# Patient Record
Sex: MALE | Race: OTHER | Employment: OTHER | URBAN - METROPOLITAN AREA
[De-identification: names, ages, dates, MRNs, and addresses within clinical notes are randomized per-mention and may not be internally consistent; named-entity substitution may affect disease eponyms.]

---

## 2022-04-06 ENCOUNTER — PREPPED CHART (OUTPATIENT)
Dept: URBAN - METROPOLITAN AREA CLINIC 52 | Facility: CLINIC | Age: 77
End: 2022-04-06

## 2022-04-07 ENCOUNTER — NEW PATIENT (OUTPATIENT)
Dept: URBAN - METROPOLITAN AREA CLINIC 52 | Facility: CLINIC | Age: 77
End: 2022-04-07

## 2022-04-07 DIAGNOSIS — H25.13: ICD-10-CM

## 2022-04-07 DIAGNOSIS — H52.13: ICD-10-CM

## 2022-04-07 PROCEDURE — 92015 DETERMINE REFRACTIVE STATE: CPT

## 2022-04-07 PROCEDURE — 92004 COMPRE OPH EXAM NEW PT 1/>: CPT

## 2022-04-07 ASSESSMENT — KERATOMETRY
OS_K1POWER_DIOPTERS: 41.00
OD_AXISANGLE_DEGREES: 168
OS_AXISANGLE2_DEGREES: 109
OD_AXISANGLE2_DEGREES: 78
OS_K2POWER_DIOPTERS: 42.50
OS_AXISANGLE_DEGREES: 019
OD_K2POWER_DIOPTERS: 42.50
OD_K1POWER_DIOPTERS: 41.25

## 2022-04-07 ASSESSMENT — VISUAL ACUITY
OU_SC: J16
OS_PH: 20/40
OS_CC: J10-1
OD_CC: J10
OS_CC: 20/200
OD_CC: 20/400
OU_CC: J10
OU_CC: 20/400
OD_GLARE: 20/200
OS_GLARE: 20/200
OD_PH: 20/50+2

## 2022-04-07 ASSESSMENT — TONOMETRY
OS_IOP_MMHG: 15
OD_IOP_MMHG: 13

## 2022-04-07 NOTE — PATIENT DISCUSSION
Explained to patient that if he chooses not to get cataract surgery, he will NEED to change his glasses if he wants to continue to drive. spouse

## 2022-11-10 ENCOUNTER — PRE-OP/H&P (OUTPATIENT)
Dept: URBAN - METROPOLITAN AREA CLINIC 52 | Facility: CLINIC | Age: 77
End: 2022-11-10

## 2022-11-10 DIAGNOSIS — H25.12: ICD-10-CM

## 2022-11-10 DIAGNOSIS — H52.203: ICD-10-CM

## 2022-11-10 PROCEDURE — 92136 OPHTHALMIC BIOMETRY: CPT

## 2022-11-10 PROCEDURE — 92025IOL CORNEAL TOPOGRAPHY PREMIUM IOL

## 2022-11-10 PROCEDURE — PREOP PRE OP VISIT

## 2022-11-10 ASSESSMENT — VISUAL ACUITY
OS_PH: 20/50-2
OS_CC: 20/100-1
OD_CC: 20/60
OD_PH: 20/30-1

## 2022-11-10 ASSESSMENT — KERATOMETRY
OS_K2POWER_DIOPTERS: 41.50
OD_AXISANGLE2_DEGREES: 159
OS_AXISANGLE_DEGREES: 110
OD_K2POWER_DIOPTERS: 41.62
OD_K1POWER_DIOPTERS: 42.62
OS_K1POWER_DIOPTERS: 42.75
OS_AXISANGLE2_DEGREES: 20
OD_AXISANGLE_DEGREES: 069

## 2022-11-10 ASSESSMENT — TONOMETRY
OS_IOP_MMHG: 12
OD_IOP_MMHG: 12

## 2022-11-10 NOTE — PATIENT DISCUSSION
Explained to patient that if he chooses not to get cataract surgery, he will NEED to change his glasses if he wants to continue to drive.

## 2022-11-16 ENCOUNTER — POST-OP (OUTPATIENT)
Dept: URBAN - METROPOLITAN AREA CLINIC 52 | Facility: CLINIC | Age: 77
End: 2022-11-16

## 2022-11-16 ENCOUNTER — SURGERY/PROCEDURE (OUTPATIENT)
Dept: URBAN - METROPOLITAN AREA SURGERY 16 | Facility: SURGERY | Age: 77
End: 2022-11-16

## 2022-11-16 DIAGNOSIS — Z98.42: ICD-10-CM

## 2022-11-16 DIAGNOSIS — H25.12: ICD-10-CM

## 2022-11-16 PROBLEM — Z96.1: Noted: 2022-11-16

## 2022-11-16 PROCEDURE — 66984CV REMOVE CATARACT, INSERT LENS, CUSTOM VISION

## 2022-11-16 PROCEDURE — 99199PSUP SUPERIOR VISION PACKAGE

## 2022-11-16 ASSESSMENT — VISUAL ACUITY: OD_SC: 20/20-1

## 2022-11-16 ASSESSMENT — KERATOMETRY
OS_K1POWER_DIOPTERS: 42.75
OD_K1POWER_DIOPTERS: 42.62
OD_AXISANGLE2_DEGREES: 159
OS_AXISANGLE2_DEGREES: 20
OD_K2POWER_DIOPTERS: 41.62
OS_AXISANGLE2_DEGREES: 20
OS_K1POWER_DIOPTERS: 42.75
OD_AXISANGLE_DEGREES: 069
OD_K2POWER_DIOPTERS: 41.62
OS_AXISANGLE_DEGREES: 110
OD_K1POWER_DIOPTERS: 42.62
OS_AXISANGLE_DEGREES: 110
OS_K2POWER_DIOPTERS: 41.50
OD_AXISANGLE2_DEGREES: 159
OS_K2POWER_DIOPTERS: 41.50
OD_AXISANGLE_DEGREES: 069

## 2022-11-16 ASSESSMENT — TONOMETRY: OD_IOP_MMHG: 25

## 2022-11-22 ENCOUNTER — PRE-OP/H&P (OUTPATIENT)
Dept: URBAN - METROPOLITAN AREA CLINIC 50 | Facility: CLINIC | Age: 77
End: 2022-11-22

## 2022-11-22 DIAGNOSIS — H25.11: ICD-10-CM

## 2022-11-22 PROCEDURE — 92136 - 2N OPHTHALMIC BIOMETRY BY PARTIAL COHERENCE INTERFEROMETRY WITH INTRAOCULAR LENS POWER CALCULATION

## 2022-11-22 PROCEDURE — 99213 OFFICE O/P EST LOW 20 MIN: CPT

## 2022-11-22 ASSESSMENT — VISUAL ACUITY
OD_SC: CF 5FT
OD_PH: 20/50-1
OS_SC: J3@16"
OD_CC: 20/60
OS_SC: 20/30@22"
OS_SC: 20/20-1
OD_GLARE: 20/60
OD_GLARE: 20/80
OU_SC: 20/20-2

## 2022-11-22 ASSESSMENT — TONOMETRY
OD_IOP_MMHG: 12
OS_IOP_MMHG: 13

## 2022-11-30 ENCOUNTER — POST-OP (OUTPATIENT)
Dept: URBAN - METROPOLITAN AREA CLINIC 52 | Facility: CLINIC | Age: 77
End: 2022-11-30

## 2022-11-30 ENCOUNTER — SURGERY/PROCEDURE (OUTPATIENT)
Dept: URBAN - METROPOLITAN AREA SURGERY 16 | Facility: SURGERY | Age: 77
End: 2022-11-30

## 2022-11-30 DIAGNOSIS — Z96.1: ICD-10-CM

## 2022-11-30 DIAGNOSIS — Z98.41: ICD-10-CM

## 2022-11-30 DIAGNOSIS — H25.11: ICD-10-CM

## 2022-11-30 PROCEDURE — 66984CV REMOVE CATARACT, INSERT LENS, CUSTOM VISION

## 2022-11-30 PROCEDURE — 99199PSUP SUPERIOR VISION PACKAGE

## 2022-11-30 ASSESSMENT — VISUAL ACUITY: OD_SC: 20/30

## 2022-11-30 ASSESSMENT — TONOMETRY: OD_IOP_MMHG: 20

## 2022-12-08 ENCOUNTER — POST-OP (OUTPATIENT)
Dept: URBAN - METROPOLITAN AREA CLINIC 52 | Facility: CLINIC | Age: 77
End: 2022-12-08

## 2022-12-08 PROCEDURE — 99024 POSTOP FOLLOW-UP VISIT: CPT

## 2022-12-08 ASSESSMENT — VISUAL ACUITY
OU_SC: J2@16"
OU_SC: 20/20+1
OU_SC: 20/20@24"
OD_SC: J3@16"
OS_SC: 20/20
OD_SC: 20/20-1
OD_SC: 20/20@24"

## 2022-12-08 ASSESSMENT — TONOMETRY
OD_IOP_MMHG: 13
OS_IOP_MMHG: 13

## 2023-01-12 ENCOUNTER — POST-OP (OUTPATIENT)
Dept: URBAN - METROPOLITAN AREA CLINIC 52 | Facility: CLINIC | Age: 78
End: 2023-01-12

## 2023-01-12 DIAGNOSIS — Z98.41: ICD-10-CM

## 2023-01-12 DIAGNOSIS — Z96.1: ICD-10-CM

## 2023-01-12 PROCEDURE — 99024 POSTOP FOLLOW-UP VISIT: CPT

## 2023-01-12 PROCEDURE — 92015 DETERMINE REFRACTIVE STATE: CPT

## 2023-01-12 ASSESSMENT — VISUAL ACUITY
OD_SC: 20/20
OD_SC: 20/20-2
OS_SC: 20/20
OU_SC: 20/20 @ 21"
OU_SC: 20/20
OS_SC: 20/20

## 2023-01-12 ASSESSMENT — TONOMETRY
OD_IOP_MMHG: 12
OS_IOP_MMHG: 12

## 2024-05-13 ENCOUNTER — ESTABLISHED PATIENT (OUTPATIENT)
Dept: URBAN - METROPOLITAN AREA CLINIC 52 | Facility: CLINIC | Age: 79
End: 2024-05-13

## 2024-05-13 DIAGNOSIS — T15.02XA: ICD-10-CM

## 2024-05-13 DIAGNOSIS — T26.52XA: ICD-10-CM

## 2024-05-13 PROCEDURE — 99214 OFFICE O/P EST MOD 30 MIN: CPT | Mod: 25

## 2024-05-13 PROCEDURE — 65222 REMOVE FOREIGN BODY FROM EYE: CPT

## 2024-05-13 RX ORDER — TOBRAMYCIN AND DEXAMETHASONE 1; 3 MG/ML; MG/ML
1 SUSPENSION/ DROPS OPHTHALMIC
Start: 2024-05-13 | End: 2024-05-17

## 2024-05-13 ASSESSMENT — TONOMETRY
OS_IOP_MMHG: 13
OD_IOP_MMHG: 12

## 2024-05-13 ASSESSMENT — VISUAL ACUITY
OD_SC: 20/20
OS_SC: 20/25